# Patient Record
Sex: MALE | Race: WHITE | NOT HISPANIC OR LATINO | Employment: FULL TIME | URBAN - METROPOLITAN AREA
[De-identification: names, ages, dates, MRNs, and addresses within clinical notes are randomized per-mention and may not be internally consistent; named-entity substitution may affect disease eponyms.]

---

## 2020-05-06 ENCOUNTER — HOSPITAL ENCOUNTER (OUTPATIENT)
Dept: GENERAL RADIOLOGY | Facility: HOSPITAL | Age: 55
Discharge: HOME | End: 2020-05-06
Admitting: FAMILY MEDICINE

## 2020-05-06 DIAGNOSIS — R05.9 COUGH: ICD-10-CM

## 2020-05-06 PROCEDURE — 71046 X-RAY EXAM CHEST 2 VIEWS: CPT

## 2024-10-30 ENCOUNTER — TELEPHONE (OUTPATIENT)
Age: 59
End: 2024-10-30
Payer: COMMERCIAL

## 2024-10-30 NOTE — TELEPHONE ENCOUNTER
Pt called into office wanting to schedule his physical but wasn't for sure when it last was. I couldn't see any past appts or records and wanted to check to see if back of office had an recollection of pts last physical

## 2024-12-30 ENCOUNTER — OFFICE VISIT (OUTPATIENT)
Age: 59
End: 2024-12-30
Payer: COMMERCIAL

## 2024-12-30 VITALS
DIASTOLIC BLOOD PRESSURE: 70 MMHG | HEIGHT: 69 IN | TEMPERATURE: 97 F | SYSTOLIC BLOOD PRESSURE: 134 MMHG | WEIGHT: 238 LBS | HEART RATE: 74 BPM | BODY MASS INDEX: 35.25 KG/M2 | RESPIRATION RATE: 14 BRPM | OXYGEN SATURATION: 98 %

## 2024-12-30 DIAGNOSIS — E55.9 VITAMIN D DEFICIENCY: ICD-10-CM

## 2024-12-30 DIAGNOSIS — M79.651 RIGHT THIGH PAIN: ICD-10-CM

## 2024-12-30 DIAGNOSIS — E78.2 MIXED HYPERLIPIDEMIA: ICD-10-CM

## 2024-12-30 DIAGNOSIS — R73.03 PREDIABETES: ICD-10-CM

## 2024-12-30 DIAGNOSIS — K21.9 GASTROESOPHAGEAL REFLUX DISEASE WITHOUT ESOPHAGITIS: ICD-10-CM

## 2024-12-30 DIAGNOSIS — R25.2 MUSCLE CRAMPS: ICD-10-CM

## 2024-12-30 DIAGNOSIS — Z00.00 ENCOUNTER FOR ROUTINE ADULT HEALTH EXAMINATION WITHOUT ABNORMAL FINDINGS: Primary | ICD-10-CM

## 2024-12-30 DIAGNOSIS — Z13.1 SCREENING FOR DIABETES MELLITUS: ICD-10-CM

## 2024-12-30 DIAGNOSIS — Z12.5 SCREENING FOR MALIGNANT NEOPLASM OF PROSTATE: ICD-10-CM

## 2024-12-30 DIAGNOSIS — R22.41 MASS OF RIGHT THIGH: ICD-10-CM

## 2024-12-30 PROBLEM — E80.4 GILBERT'S SYNDROME: Status: ACTIVE | Noted: 2018-02-01

## 2024-12-30 PROBLEM — M17.9 OSTEOARTHRITIS OF KNEE: Status: ACTIVE | Noted: 2018-02-01

## 2024-12-30 PROBLEM — K76.0 STEATOSIS OF LIVER: Status: ACTIVE | Noted: 2018-02-01

## 2024-12-30 PROCEDURE — 99386 PREV VISIT NEW AGE 40-64: CPT | Performed by: FAMILY MEDICINE

## 2024-12-30 RX ORDER — MULTIPLE VITAMINS W/ MINERALS TAB 9MG-400MCG
1 TAB ORAL DAILY
COMMUNITY

## 2024-12-30 NOTE — PROGRESS NOTES
Chief Complaint  Annual Exam    Subjective        Julian Bey presents to [facility]  History of Present Illness    History of Present Illness  The patient presents for evaluation of a mass in the right medial thigh, left foot pain, and elevated cholesterol.    He has been experiencing a sensation of pressure on his nerves in the right medial thigh, which he describes as a dull, constant pain. This discomfort has been present for a couple of months and does not disrupt his sleep. He reports no enlargement of the lump since its discovery. He expresses concern about the possibility of a tumor. He also reports a sensation akin to a pulled muscle in the back of his calf, accompanied by soreness in the muscle area. He is uncertain if these symptoms are related to the lump. He reports no history of blood clots. He recalls an incident approximately 2 months ago where he experienced pain in the back of his calf, followed by discomfort in his right brachial fossa. The pain then seemed to migrate to his left leg before returning to its original location.    He has been experiencing significant pain in his left foot, which he attributes to the use of steel toe boots. He also reports a tendency for his foot to turn outwards during walking. He recently took a vacation at the beginning of the month, during which he abstained from wearing the boots for 5 to 6 days. He noticed an improvement in his foot condition during this period, but the pain returned upon resuming work and wearing the boots.    He has been maintaining a healthy lifestyle for the past 5 to 6 weeks, with the exception of Heather. He has been losing weight and aims to lose an additional 40 to 50 pounds. He expresses a desire to avoid developing diabetes or requiring blood pressure medication.    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 Despite drinking at least a gallon of fluids daily, he experienced severe cramping in both legs, a symptom he had never encountered before. These cramps were so intense that they impeded his ability to walk or stand. Although the frequency of these cramps has decreased, he still experiences them occasionally. He acknowledges a lack of regular exercise. His job involves extensive walking and stair climbing on concrete floors. During the summer, prior to the installation of new machinery, he was required to wear a [employer] suit for 1 to 1.5 hours, which resulted in excessive sweating. He describes a constant sensation of impending muscle cramps, as if his muscles are perpetually contracted. He anticipates a promotion in the coming weeks that will reduce his physical workload and plans to transition to the night shift to avoid the daytime heat.    SOCIAL HISTORY  He works at [employer] in [place].      Objective   Vital Signs:  /70 (BP Location: Left arm, Patient Position: Sitting, Cuff Size: Adult)   Pulse 74   Temp 97 °F (36.1 °C) (Temporal)   Resp 14   Ht 175.3 cm (69\")   Wt 108 kg (238 lb)   SpO2 98%   BMI 35.15 kg/m²   Estimated body mass index is 35.15 kg/m² as calculated from the following:    Height as of this encounter: 175.3 cm (69\").    Weight as of this encounter: 108 kg (238 lb).    Class 2 Severe Obesity (BMI >=35 and <=39.9). Obesity-related health conditions include the following: dyslipidemias. Obesity is unchanged. BMI is is above average; BMI management plan is completed. We discussed portion control and increasing exercise.        Physical Exam  Constitutional:       General: He is not in acute distress.     Appearance: Normal appearance. He is not ill-appearing, toxic-appearing or diaphoretic.   HENT:      Head: Normocephalic and atraumatic.      Right Ear: There is no impacted cerumen.      Left Ear: There is no impacted cerumen.      Nose: No congestion or rhinorrhea.      " Mouth/Throat:      Pharynx: Oropharynx is clear. No oropharyngeal exudate or posterior oropharyngeal erythema.   Eyes:      General: No scleral icterus.        Right eye: No discharge.         Left eye: No discharge.      Extraocular Movements: Extraocular movements intact.      Conjunctiva/sclera: Conjunctivae normal.      Pupils: Pupils are equal, round, and reactive to light.   Cardiovascular:      Rate and Rhythm: Normal rate and regular rhythm.      Pulses: Normal pulses.      Heart sounds: Normal heart sounds.   Pulmonary:      Effort: Pulmonary effort is normal.      Breath sounds: Normal breath sounds.   Abdominal:      General: Abdomen is flat. Bowel sounds are normal.      Palpations: Abdomen is soft.   Musculoskeletal:         General: Normal range of motion.      Cervical back: Normal range of motion and neck supple.   Skin:     General: Skin is warm.   Neurological:      General: No focal deficit present.      Mental Status: He is alert and oriented to person, place, and time. Mental status is at baseline.   Psychiatric:         Mood and Affect: Mood normal.         Behavior: Behavior normal.         Thought Content: Thought content normal.         Judgment: Judgment normal.             There is a small lump in the right medial thigh.         Result Review :           Results  Laboratory Studies  Vitamin D level was low at 23.5. Hemoglobin A1c was 6.2. LDL cholesterol was 129. Bilirubin was 2.0.             Patient Counseling:  --Nutrition: Stressed importance of moderation in sodium/caffeine intake, saturated fat and cholesterol, caloric balance, sufficient intake of fresh fruits, vegetables, fiber, calcium, iron, and 1 mg of folate supplement per day (for females capable of pregnancy).  --Exercise: Stressed the importance of regular exercise.   --Substance Abuse: Discussed cessation/primary prevention of tobacco, alcohol, or other drug use; driving or other dangerous activities under the influence;  availability of treatment for abuse.    --Sexuality: Discussed sexually transmitted diseases, partner selection, use of condoms, avoidance of unintended pregnancy  and contraceptive alternatives.   --Injury prevention: Discussed safety belts, safety helmets, smoke detector, smoking near bedding or upholstery.   --Dental health: Discussed importance of regular tooth brushing, flossing, and dental visits.  --Immunizations reviewed.  --Discussed benefits of screening colonoscopy.  --After hours service discussed with patient       Assessment and Plan   Diagnoses and all orders for this visit:    1. Encounter for routine adult health examination without abnormal findings (Primary)  -     Basic Metabolic Panel  -     CBC & Differential  -     Hepatic Function Panel (6) ([facility])  -     Hemoglobin A1c  -     Lipid Panel    2. Mixed hyperlipidemia  -     Hepatic Function Panel (6) ([facility])  -     Lipid Panel    3. Screening for diabetes mellitus  -     Basic Metabolic Panel  -     Hemoglobin A1c    4. Screening for malignant neoplasm of prostate  -     PSA SCREENING    5. Vitamin D deficiency  -     Vitamin D 25 hydroxy    6. Right thigh pain    7. Muscle cramps  -     Lipid Panel  -     Magnesium    8. Gastroesophageal reflux disease without esophagitis    9. Mass of right thigh  -     MRI Femur Right With & Without Contrast; Future    10. Prediabetes  -     Hemoglobin A1c        Assessment & Plan  1. Right thigh mass.  The patient reports a dull, constant discomfort in the right medial thigh, which has been present for a couple of months. Physical examination did not reveal any palpable abnormalities, but the patient describes a sensation of something pressing on a nerve. An MRI of the right thigh will be scheduled to further investigate the nature of the mass.    2. Left foot pain.  The patient reports pain in the left foot, which he attributes to wearing steel toe boots and having a flatfoot (pes planovalgus). He is  advised to visit a store like [employer] for insoles to provide better support and alleviate the pain.    3. Prediabetes.  The patient's hemoglobin A1c was 6.2 last year, indicating prediabetes. He is advised to continue monitoring his diet and weight loss efforts. Blood work will be conducted today to reassess his current status.    4. Elevated cholesterol.  The patient's LDL cholesterol was 129 at the last visit, which is high. He is advised to continue with dietary modifications and consider increasing physical activity. Blood work will be conducted today to reassess his current cholesterol levels.    5. Gilbert's syndrome.  The patient has a long-standing history of Gilbert's syndrome, with a bilirubin level of 2.0 noted at the last visit. No specific treatment is required, but he is advised to maintain hydration and avoid fasting.            Follow Up   Return if symptoms worsen or fail to improve.  Patient was given instructions and counseling regarding his condition or for health maintenance advice. Please see specific information pulled into the AVS if appropriate.         Patient or patient representative verbalized consent for the use of Ambient Listening during the visit with  Marceilno Morocho MD for chart documentation. 12/30/2024  09:28 EST

## 2024-12-31 LAB
25(OH)D3+25(OH)D2 SERPL-MCNC: 45.7 NG/ML (ref 30–100)
ALBUMIN SERPL-MCNC: 4.6 G/DL (ref 3.8–4.9)
ALP SERPL-CCNC: 77 IU/L (ref 44–121)
ALT SERPL-CCNC: 33 IU/L (ref 0–44)
AST SERPL-CCNC: 25 IU/L (ref 0–40)
BASOPHILS # BLD AUTO: 0 X10E3/UL (ref 0–0.2)
BASOPHILS NFR BLD AUTO: 1 %
BILIRUB DIRECT SERPL-MCNC: 0.4 MG/DL (ref 0–0.4)
BILIRUB SERPL-MCNC: 1.7 MG/DL (ref 0–1.2)
BUN SERPL-MCNC: 11 MG/DL (ref 6–24)
BUN/CREAT SERPL: 10 (ref 9–20)
CALCIUM SERPL-MCNC: 9.5 MG/DL (ref 8.7–10.2)
CHLORIDE SERPL-SCNC: 101 MMOL/L (ref 96–106)
CHOLEST SERPL-MCNC: 199 MG/DL (ref 100–199)
CO2 SERPL-SCNC: 24 MMOL/L (ref 20–29)
CREAT SERPL-MCNC: 1.09 MG/DL (ref 0.76–1.27)
EGFRCR SERPLBLD CKD-EPI 2021: 78 ML/MIN/1.73
EOSINOPHIL # BLD AUTO: 0.1 X10E3/UL (ref 0–0.4)
EOSINOPHIL NFR BLD AUTO: 2 %
ERYTHROCYTE [DISTWIDTH] IN BLOOD BY AUTOMATED COUNT: 12.8 % (ref 11.6–15.4)
GLUCOSE SERPL-MCNC: 104 MG/DL (ref 70–99)
HBA1C MFR BLD: 5.7 % (ref 4.8–5.6)
HCT VFR BLD AUTO: 47.8 % (ref 37.5–51)
HDLC SERPL-MCNC: 39 MG/DL
HGB BLD-MCNC: 16.2 G/DL (ref 13–17.7)
IMM GRANULOCYTES # BLD AUTO: 0 X10E3/UL (ref 0–0.1)
IMM GRANULOCYTES NFR BLD AUTO: 0 %
LDLC SERPL CALC-MCNC: 136 MG/DL (ref 0–99)
LYMPHOCYTES # BLD AUTO: 1.1 X10E3/UL (ref 0.7–3.1)
LYMPHOCYTES NFR BLD AUTO: 27 %
MAGNESIUM SERPL-MCNC: 2 MG/DL (ref 1.6–2.3)
MCH RBC QN AUTO: 30.9 PG (ref 26.6–33)
MCHC RBC AUTO-ENTMCNC: 33.9 G/DL (ref 31.5–35.7)
MCV RBC AUTO: 91 FL (ref 79–97)
MONOCYTES # BLD AUTO: 0.4 X10E3/UL (ref 0.1–0.9)
MONOCYTES NFR BLD AUTO: 10 %
NEUTROPHILS # BLD AUTO: 2.5 X10E3/UL (ref 1.4–7)
NEUTROPHILS NFR BLD AUTO: 60 %
PLATELET # BLD AUTO: 152 X10E3/UL (ref 150–450)
POTASSIUM SERPL-SCNC: 4.4 MMOL/L (ref 3.5–5.2)
PSA SERPL-MCNC: 0.6 NG/ML (ref 0–4)
RBC # BLD AUTO: 5.25 X10E6/UL (ref 4.14–5.8)
SODIUM SERPL-SCNC: 139 MMOL/L (ref 134–144)
TRIGL SERPL-MCNC: 130 MG/DL (ref 0–149)
VLDLC SERPL CALC-MCNC: 24 MG/DL (ref 5–40)
WBC # BLD AUTO: 4.2 X10E3/UL (ref 3.4–10.8)

## 2025-01-24 ENCOUNTER — HOSPITAL ENCOUNTER (OUTPATIENT)
Dept: MRI IMAGING | Facility: HOSPITAL | Age: 60
Discharge: HOME | End: 2025-01-24
Admitting: FAMILY MEDICINE
Payer: COMMERCIAL

## 2025-01-24 DIAGNOSIS — R22.41 MASS OF RIGHT THIGH: ICD-10-CM

## 2025-01-24 PROCEDURE — A9579 GAD-BASE MR CONTRAST NOS,1ML: HCPCS | Performed by: FAMILY MEDICINE

## 2025-01-24 PROCEDURE — 25010000002 GADOTERIDOL PER 1 ML: Performed by: FAMILY MEDICINE

## 2025-01-24 PROCEDURE — 73720 MRI LWR EXTREMITY W/O&W/DYE: CPT

## 2025-01-24 RX ADMIN — GADOTERIDOL 20 ML: 279.3 INJECTION, SOLUTION INTRAVENOUS at 14:22

## 2025-02-26 NOTE — PROGRESS NOTES
Chief Complaint  Prediabetes, Hyperlipidemia, and Establish Care    Subjective          Julina is a 59 y.o. male  who presents to [facility]     Patient Care Team:  Jennifer Moffett APRN as PCP - General (Family Medicine)     History of Present Illness  Julian is here today to establish care  Previous PCP Dr. Marcelino Morocho in [place], [place].  Last visit on 12/30/2024  New patient      Patient presents for follow-up of prediabetes  This is an established problem  Interim treatment changes: working on weight loss  Current medications: none  Hgba1c 6.2 % on 12/19/2023  Hgba1c 5.7 % on 12/30/2024    ++++++++++++++++++++++++    Patient presents for follow-up of hyperlipidemia  This is an established problem  Interim treatment changes: working on weight loss  Current medications: none  Lipid panel 12/30/2024      Julian Bey  has a past medical history of At high risk for cardiovascular disease, Carpal tunnel syndrome, bilateral, Gastroesophageal reflux disease without esophagitis, Gilbert's syndrome, Kidney stones (02/25/2014), Osteoarthritis of knee, Prediabetes, Prolapsed lumbosacral intervertebral disc, and Steatosis of liver.      Review of Systems   Constitutional:  Negative for fatigue and fever.   HENT:  Negative for ear pain and sore throat.    Eyes:  Negative for visual disturbance.   Respiratory:  Negative for cough and shortness of breath.    Cardiovascular:  Negative for chest pain, palpitations and leg swelling.   Gastrointestinal:  Negative for abdominal pain, blood in stool, nausea and vomiting.   Neurological:  Negative for dizziness and headaches.   Psychiatric/Behavioral:  Negative for dysphoric mood. The patient is not nervous/anxious.         Family History   Problem Relation Age of Onset    Hypertension Mother     Pulmonary embolism Father     Cancer Father         bladder cancer    Hypertension Brother     Hypertension Brother     Hypertension Brother     Anemia  "Daughter         Past Surgical History:   Procedure Laterality Date    COLONOSCOPY  2016    EXTRACORPOREAL SHOCK WAVE LITHOTRIPSY (ESWL)          Social History     Socioeconomic History    Marital status:      Spouse name: Jayda Bey    Number of children: 4    Highest education level: High school graduate   Tobacco Use    Smoking status: Never     Passive exposure: Never    Smokeless tobacco: Never   Vaping Use    Vaping status: Never Used   Substance and Sexual Activity    Alcohol use: Yes     Comment: Maybe a drink or two a month    Drug use: Never    Sexual activity: Yes     Partners: Female     Birth control/protection: None        Immunization History   Administered Date(s) Administered    Shingrix 02/18/2022    Zoster, Unspecified 10/01/2022       Objective       Current Outpatient Medications:     Magnesium 250 MG capsule, Take  by mouth., Disp: , Rfl:     multivitamin with minerals tablet tablet, Take 1 tablet by mouth Daily., Disp: , Rfl:     vitamin D3 125 MCG (5000 UT) capsule capsule, Take 1 capsule by mouth Daily., Disp: , Rfl:     Vital Signs:      /86   Pulse 71   Temp 97.9 °F (36.6 °C) (Temporal)   Resp 20   Ht 175.3 cm (69.02\")   Wt 104 kg (229 lb 3.2 oz)   SpO2 97%   BMI 33.83 kg/m²     Vitals:    02/27/25 0854   BP: 134/86   Pulse: 71   Resp: 20   Temp: 97.9 °F (36.6 °C)   TempSrc: Temporal   SpO2: 97%   Weight: 104 kg (229 lb 3.2 oz)   Height: 175.3 cm (69.02\")      Physical Exam  Vitals reviewed.   Constitutional:       General: He is not in acute distress.     Appearance: Normal appearance. He is obese.   HENT:      Head: Normocephalic and atraumatic.      Right Ear: Tympanic membrane, ear canal and external ear normal.      Left Ear: Tympanic membrane, ear canal and external ear normal.      Mouth/Throat:      Mouth: Mucous membranes are moist.      Pharynx: Oropharynx is clear.   Eyes:      General: No scleral icterus.     Conjunctiva/sclera: Conjunctivae normal. "   Neck:      Thyroid: No thyromegaly.   Cardiovascular:      Rate and Rhythm: Normal rate and regular rhythm.      Heart sounds: Normal heart sounds.   Pulmonary:      Effort: Pulmonary effort is normal. No respiratory distress.      Breath sounds: Normal breath sounds. No wheezing.   Musculoskeletal:      Cervical back: Neck supple.      Right lower leg: No edema.      Left lower leg: No edema.   Lymphadenopathy:      Cervical: No cervical adenopathy.   Skin:     General: Skin is warm and dry.   Neurological:      Mental Status: He is alert and oriented to person, place, and time.   Psychiatric:         Mood and Affect: Mood normal.         Behavior: Behavior normal.        Result Review :   The following data was reviewed by: ISIAH Chavarria on 02/27/2025:  Common labs          12/30/2024    09:48   Common Labs   Glucose 104    BUN 11    Creatinine 1.09    Sodium 139    Potassium 4.4    Chloride 101    Calcium 9.5    Albumin 4.6    Total Bilirubin 1.7    Alkaline Phosphatase 77    AST (SGOT) 25    ALT (SGPT) 33    WBC 4.2    Hemoglobin 16.2    Hematocrit 47.8    Platelets 152    Total Cholesterol 199    Triglycerides 130    HDL Cholesterol 39    LDL Cholesterol  136    Hemoglobin A1C 5.7    PSA 0.6         Assessment and Plan    Diagnoses and all orders for this visit:    1. Prediabetes (Primary)  Overview:  Hgba1c 6.2 % on 12/19/2023  Hgba1c 5.7 % on 12/30/2024    Assessment & Plan:  Established problem.  Continue lifestyle changes: diet, exercise and weight reduction.  Recheck hgba1c in December 2025      2. Mixed hyperlipidemia  Assessment & Plan:  Established problem  Continue to work on diet and exercise  Recheck lipid panel in December 2025.      3. Vitamin D deficiency  Overview:  Vitamin D 23.5 on 12/19/2023  Vitamin D 45.7 on 12/30/2024    Assessment & Plan:  Established problem  Continue Vitamin D supplement.      4. Class 1 obesity due to excess calories with serious comorbidity and body mass  index (BMI) of 33.0 to 33.9 in adult  Assessment & Plan:  Patient's (Body mass index is 33.83 kg/m².) indicates that they are obese (BMI >30) with health conditions that include hypertension and dyslipidemias . Weight is improving with lifestyle modifications. BMI  is above average; BMI management plan is completed. We discussed low calorie, low carb based diet program, portion control, and increasing exercise.              Follow Up   Return in about 10 months (around 12/27/2025) for Annual physical.  Patient was given instructions and counseling regarding his condition or for health maintenance advice. Please see specific information pulled into the AVS if appropriate.    There are no Patient Instructions on file for this visit.

## 2025-02-27 ENCOUNTER — OFFICE VISIT (OUTPATIENT)
Dept: FAMILY MEDICINE CLINIC | Facility: CLINIC | Age: 60
End: 2025-02-27
Payer: COMMERCIAL

## 2025-02-27 VITALS
RESPIRATION RATE: 20 BRPM | OXYGEN SATURATION: 97 % | BODY MASS INDEX: 33.95 KG/M2 | SYSTOLIC BLOOD PRESSURE: 134 MMHG | TEMPERATURE: 97.9 F | HEIGHT: 69 IN | DIASTOLIC BLOOD PRESSURE: 86 MMHG | WEIGHT: 229.2 LBS | HEART RATE: 71 BPM

## 2025-02-27 DIAGNOSIS — R73.03 PREDIABETES: Primary | ICD-10-CM

## 2025-02-27 DIAGNOSIS — E55.9 VITAMIN D DEFICIENCY: ICD-10-CM

## 2025-02-27 DIAGNOSIS — E78.2 MIXED HYPERLIPIDEMIA: ICD-10-CM

## 2025-02-27 DIAGNOSIS — E66.09 CLASS 1 OBESITY DUE TO EXCESS CALORIES WITH SERIOUS COMORBIDITY AND BODY MASS INDEX (BMI) OF 33.0 TO 33.9 IN ADULT: ICD-10-CM

## 2025-02-27 DIAGNOSIS — E66.811 CLASS 1 OBESITY DUE TO EXCESS CALORIES WITH SERIOUS COMORBIDITY AND BODY MASS INDEX (BMI) OF 33.0 TO 33.9 IN ADULT: ICD-10-CM

## 2025-02-27 PROCEDURE — 99214 OFFICE O/P EST MOD 30 MIN: CPT | Performed by: NURSE PRACTITIONER

## 2025-03-01 PROBLEM — E66.09 CLASS 1 OBESITY DUE TO EXCESS CALORIES WITH SERIOUS COMORBIDITY AND BODY MASS INDEX (BMI) OF 33.0 TO 33.9 IN ADULT: Status: ACTIVE | Noted: 2025-03-01

## 2025-03-01 PROBLEM — E66.811 CLASS 1 OBESITY DUE TO EXCESS CALORIES WITH SERIOUS COMORBIDITY AND BODY MASS INDEX (BMI) OF 33.0 TO 33.9 IN ADULT: Status: ACTIVE | Noted: 2025-03-01

## 2025-03-01 NOTE — ASSESSMENT & PLAN NOTE
Patient's (Body mass index is 33.83 kg/m².) indicates that they are obese (BMI >30) with health conditions that include hypertension and dyslipidemias . Weight is improving with lifestyle modifications. BMI  is above average; BMI management plan is completed. We discussed low calorie, low carb based diet program, portion control, and increasing exercise.

## 2025-03-01 NOTE — ASSESSMENT & PLAN NOTE
Established problem.  Continue lifestyle changes: diet, exercise and weight reduction.  Recheck hgba1c in December 2025